# Patient Record
Sex: FEMALE | Race: WHITE | NOT HISPANIC OR LATINO
[De-identification: names, ages, dates, MRNs, and addresses within clinical notes are randomized per-mention and may not be internally consistent; named-entity substitution may affect disease eponyms.]

---

## 2017-01-26 ENCOUNTER — RESULT REVIEW (OUTPATIENT)
Age: 52
End: 2017-01-26

## 2017-06-01 ENCOUNTER — MESSAGE (OUTPATIENT)
Age: 52
End: 2017-06-01

## 2017-10-12 ENCOUNTER — APPOINTMENT (OUTPATIENT)
Dept: OBGYN | Facility: CLINIC | Age: 52
End: 2017-10-12
Payer: MEDICAID

## 2017-10-12 VITALS
BODY MASS INDEX: 24.11 KG/M2 | DIASTOLIC BLOOD PRESSURE: 72 MMHG | WEIGHT: 150 LBS | HEIGHT: 66 IN | SYSTOLIC BLOOD PRESSURE: 112 MMHG

## 2017-10-12 PROCEDURE — 99213 OFFICE O/P EST LOW 20 MIN: CPT

## 2017-10-20 ENCOUNTER — OTHER (OUTPATIENT)
Age: 52
End: 2017-10-20

## 2018-03-28 ENCOUNTER — OTHER (OUTPATIENT)
Age: 53
End: 2018-03-28

## 2018-06-05 ENCOUNTER — APPOINTMENT (OUTPATIENT)
Dept: FAMILY MEDICINE | Facility: CLINIC | Age: 53
End: 2018-06-05
Payer: MEDICAID

## 2018-06-05 VITALS
DIASTOLIC BLOOD PRESSURE: 70 MMHG | WEIGHT: 160.13 LBS | BODY MASS INDEX: 25.73 KG/M2 | SYSTOLIC BLOOD PRESSURE: 110 MMHG | HEART RATE: 70 BPM | HEIGHT: 66 IN | OXYGEN SATURATION: 99 %

## 2018-06-05 DIAGNOSIS — R01.1 CARDIAC MURMUR, UNSPECIFIED: ICD-10-CM

## 2018-06-05 DIAGNOSIS — R53.83 OTHER FATIGUE: ICD-10-CM

## 2018-06-05 PROCEDURE — G0444 DEPRESSION SCREEN ANNUAL: CPT

## 2018-06-05 PROCEDURE — 99203 OFFICE O/P NEW LOW 30 MIN: CPT | Mod: 25

## 2018-06-05 PROCEDURE — 36415 COLL VENOUS BLD VENIPUNCTURE: CPT

## 2018-06-05 RX ORDER — RANITIDINE 300 MG/1
300 TABLET ORAL
Qty: 30 | Refills: 0 | Status: DISCONTINUED | COMMUNITY
Start: 2018-03-23

## 2018-06-05 NOTE — HEALTH RISK ASSESSMENT
[0] : 2) Feeling down, depressed, or hopeless: Not at all (0) [Patient reported mammogram was normal] : Patient reported mammogram was normal [Patient reported PAP Smear was normal] : Patient reported PAP Smear was normal [Patient reported colonoscopy was normal] : Patient reported colonoscopy was normal [HIV test declined] : HIV test declined [Hepatitis C test declined] : Hepatitis C test declined [MammogramDate] : 01/17 [PapSmearDate] : 12/16 [ColonoscopyDate] : 03/18

## 2018-06-05 NOTE — PHYSICAL EXAM
[No Acute Distress] : no acute distress [Well Nourished] : well nourished [Well Developed] : well developed [Well-Appearing] : well-appearing [Normal Sclera/Conjunctiva] : normal sclera/conjunctiva [PERRL] : pupils equal round and reactive to light [Normal Oropharynx] : the oropharynx was normal [Normal TMs] : both tympanic membranes were normal [Normal Appearance] : was normal in appearance [Neck Supple] : was supple [Normal] : the thyroid was normal [No Respiratory Distress] : no respiratory distress  [Clear to Auscultation] : lungs were clear to auscultation bilaterally [Normal Rhythm/Effort] : normal respiratory rhythm and effort [Normal Rate] : normal rate  [Regular Rhythm] : with a regular rhythm [Normal S1, S2] : normal S1 and S2 [No Carotid Bruits] : no carotid bruits [No Edema] : there was no peripheral edema [Soft] : abdomen soft [Non Tender] : non-tender [Normal Bowel Sounds] : normal bowel sounds [Normal Posterior Cervical Nodes] : no posterior cervical lymphadenopathy [Normal Anterior Cervical Nodes] : no anterior cervical lymphadenopathy [Grossly Normal Strength/Tone] : grossly normal strength/tone [No Focal Deficits] : no focal deficits [Alert and Oriented x3] : oriented to person, place, and time [de-identified] : cardiac murmur present [de-identified] : thinning of hair

## 2018-06-05 NOTE — ASSESSMENT
[FreeTextEntry1] : Thinning of hair/hair loss:\par check blood work, reassess\par consider dermatology evaluation\par \par Fatigue:\par check blood work\par \par Cardiac murmur:\par reports not new\par asymptomatic\par consider echo\par \par Health care maintenance:\par check blood work\par follow up with GYN\par \par declines EKG today, will return for annual physical and will have EKG done at that time

## 2018-06-05 NOTE — HISTORY OF PRESENT ILLNESS
[FreeTextEntry1] : Establish care [de-identified] : \par 52 year old female presents as a new patient to establish care. \par \par Concerned about hair loss\par Hair has been falling out in clumps and thinning\par Occurring x 6-8 months\par Does admit to being stressed \par Has not seen dermatology or endocrinology\par \par Feeling fatigued\par no sob or chest pain\par \par Follows with GYN\par \par Up to date with colonoscopy, done in March 2018 by Dr. Colmenares

## 2018-06-06 LAB
25(OH)D3 SERPL-MCNC: 22.2 NG/ML
ALBUMIN SERPL ELPH-MCNC: 4.3 G/DL
ALP BLD-CCNC: 47 U/L
ALT SERPL-CCNC: 12 U/L
ANION GAP SERPL CALC-SCNC: 17 MMOL/L
AST SERPL-CCNC: 19 U/L
BASOPHILS # BLD AUTO: 0.03 K/UL
BASOPHILS NFR BLD AUTO: 0.5 %
BILIRUB SERPL-MCNC: 0.2 MG/DL
BUN SERPL-MCNC: 12 MG/DL
CALCIUM SERPL-MCNC: 9.3 MG/DL
CHLORIDE SERPL-SCNC: 103 MMOL/L
CHOLEST SERPL-MCNC: 155 MG/DL
CHOLEST/HDLC SERPL: 2.5 RATIO
CO2 SERPL-SCNC: 22 MMOL/L
CREAT SERPL-MCNC: 0.85 MG/DL
EOSINOPHIL # BLD AUTO: 0 K/UL
EOSINOPHIL NFR BLD AUTO: 0 %
FERRITIN SERPL-MCNC: 16 NG/ML
FOLATE SERPL-MCNC: 13.9 NG/ML
GLUCOSE SERPL-MCNC: 88 MG/DL
HBA1C MFR BLD HPLC: 5 %
HCT VFR BLD CALC: 36.8 %
HDLC SERPL-MCNC: 63 MG/DL
HGB BLD-MCNC: 11.9 G/DL
IMM GRANULOCYTES NFR BLD AUTO: 0 %
IRON SATN MFR SERPL: 22 %
IRON SERPL-MCNC: 72 UG/DL
LDLC SERPL CALC-MCNC: 80 MG/DL
LYMPHOCYTES # BLD AUTO: 1.77 K/UL
LYMPHOCYTES NFR BLD AUTO: 31.5 %
MAN DIFF?: NORMAL
MCHC RBC-ENTMCNC: 30.6 PG
MCHC RBC-ENTMCNC: 32.3 GM/DL
MCV RBC AUTO: 94.6 FL
MONOCYTES # BLD AUTO: 0.59 K/UL
MONOCYTES NFR BLD AUTO: 10.5 %
NEUTROPHILS # BLD AUTO: 3.23 K/UL
NEUTROPHILS NFR BLD AUTO: 57.5 %
PLATELET # BLD AUTO: 221 K/UL
POTASSIUM SERPL-SCNC: 4.3 MMOL/L
PROT SERPL-MCNC: 7 G/DL
RBC # BLD: 3.89 M/UL
RBC # FLD: 13.8 %
SODIUM SERPL-SCNC: 142 MMOL/L
T4 FREE SERPL-MCNC: 1.3 NG/DL
THYROGLOB AB SERPL-ACNC: <20 IU/ML
THYROPEROXIDASE AB SERPL IA-ACNC: <10 IU/ML
TIBC SERPL-MCNC: 322 UG/DL
TRIGL SERPL-MCNC: 60 MG/DL
TSH SERPL-ACNC: 2.09 UIU/ML
UIBC SERPL-MCNC: 250 UG/DL
URATE SERPL-MCNC: 2.7 MG/DL
VIT B12 SERPL-MCNC: 649 PG/ML
WBC # FLD AUTO: 5.62 K/UL

## 2018-06-08 LAB — ANA SER IF-ACNC: NEGATIVE

## 2018-06-15 ENCOUNTER — RESULT REVIEW (OUTPATIENT)
Age: 53
End: 2018-06-15

## 2018-07-23 PROBLEM — Z13.71 BRCA NEGATIVE: Status: ACTIVE | Noted: 2017-01-19

## 2019-02-18 DIAGNOSIS — R19.5 OTHER FECAL ABNORMALITIES: ICD-10-CM

## 2019-02-19 ENCOUNTER — OTHER (OUTPATIENT)
Age: 54
End: 2019-02-19

## 2019-03-01 ENCOUNTER — RESULT REVIEW (OUTPATIENT)
Age: 54
End: 2019-03-01

## 2019-03-01 PROBLEM — R19.5 GUAIAC POSITIVE STOOLS: Status: RESOLVED | Noted: 2017-10-12 | Resolved: 2019-03-01

## 2019-03-07 ENCOUNTER — MESSAGE (OUTPATIENT)
Age: 54
End: 2019-03-07

## 2020-07-23 ENCOUNTER — APPOINTMENT (OUTPATIENT)
Dept: OBGYN | Facility: CLINIC | Age: 55
End: 2020-07-23
Payer: COMMERCIAL

## 2020-07-23 VITALS
HEIGHT: 66 IN | DIASTOLIC BLOOD PRESSURE: 70 MMHG | WEIGHT: 164 LBS | BODY MASS INDEX: 26.36 KG/M2 | SYSTOLIC BLOOD PRESSURE: 126 MMHG

## 2020-07-23 DIAGNOSIS — L65.9 NONSCARRING HAIR LOSS, UNSPECIFIED: ICD-10-CM

## 2020-07-23 DIAGNOSIS — Z13.228 ENCOUNTER FOR SCREENING FOR OTHER SUSPECTED ENDOCRINE DISORDER: ICD-10-CM

## 2020-07-23 DIAGNOSIS — Z13.0 ENCOUNTER FOR SCREENING FOR DISEASES OF THE BLOOD AND BLOOD-FORMING ORGANS AND CERTAIN DISORDERS INVOLVING THE IMMUNE MECHANISM: ICD-10-CM

## 2020-07-23 DIAGNOSIS — Z13.0 ENCOUNTER FOR SCREENING FOR OTHER SUSPECTED ENDOCRINE DISORDER: ICD-10-CM

## 2020-07-23 DIAGNOSIS — N63.20 UNSPECIFIED LUMP IN THE LEFT BREAST, UNSPECIFIED QUADRANT: ICD-10-CM

## 2020-07-23 DIAGNOSIS — Z87.19 PERSONAL HISTORY OF OTHER DISEASES OF THE DIGESTIVE SYSTEM: ICD-10-CM

## 2020-07-23 DIAGNOSIS — Z13.220 ENCOUNTER FOR SCREENING FOR LIPOID DISORDERS: ICD-10-CM

## 2020-07-23 DIAGNOSIS — Z13.29 ENCOUNTER FOR SCREENING FOR OTHER SUSPECTED ENDOCRINE DISORDER: ICD-10-CM

## 2020-07-23 LAB
HCG UR QL: NEGATIVE
HEMOCCULT SP1 STL QL: NEGATIVE
QUALITY CONTROL: YES

## 2020-07-23 PROCEDURE — 81025 URINE PREGNANCY TEST: CPT

## 2020-07-23 PROCEDURE — 99396 PREV VISIT EST AGE 40-64: CPT

## 2020-07-23 PROCEDURE — 82270 OCCULT BLOOD FECES: CPT

## 2020-07-23 NOTE — REVIEW OF SYSTEMS
[Headache] : headache [Nl] : Musculoskeletal [Feeling Tired] : feeling tired [Recent Wt Gain ___ Lbs] : recent [unfilled] ~Ulb weight gain [Lower Ext Edema] : lower extremity edema [Breast Lump] : breast lump [Breast Pain] : breast pain [Cold Intolerance] : intolerance to cold [Heat Intolerance] : intolerance to heat [Loss of Hair] : loss of hair

## 2020-07-23 NOTE — HISTORY OF PRESENT ILLNESS
[___ Year(s) Ago] : [unfilled] year(s) ago [Good] : being in good health [Healthy Diet] : a healthy diet [Regular Exercise] : regular exercise [___ Servings of Dairy/Day] : [unfilled] servings of dairy foods per day [Last Pap ___] : Last cervical pap smear was [unfilled] [Last Mammogram ___] : Last Mammogram was [unfilled] [FH Cardiovascular Disease] : family history of cardiovascular disease [Abnormal Cervical Cytology] : abnormal cervical cytology [HPV Positive] : positive screening for human papilloma virus [Reproductive Age] : is of reproductive age [Definite ___ (Date)] : the last menstrual period was [unfilled] [Contraception] : uses contraception [Bleeding Usually Lasts ___ Days] : usually last [unfilled] days [Vasectomy (partner)] : has a partner with a vasectomy [Pregnancy History] : pregnancy history: [Total Preg ___] : [unfilled] [Full Term ___] : [unfilled] [Sexually Active] : is sexually active [Monogamous (Male Partner)] : is monogamous with a male partner [Last Screen ___] : last STD screen was [unfilled] [HPV Vaccine NA Due to Age] : HPV vaccine not available to patient due to age [Weight Concerns] : weight concens [3 or more times/wk] :  3 or more times per week [Recent Weight Gain (___ Lbs)] : recent [unfilled] ~Ulbs weight gain [Current] : risk screening reviewed and current [Last Colonoscopy ___] : Last colonoscopy [unfilled] [Performed Within 5 Years] : lipid profile performed within the past five years [Performed: ___] : thyroid function test performed [unfilled] [Irregular Cycle Intervals] : are  irregular [Localized Pain] : localized breast pain [Mass (___cm)] : [unfilled]Ucm breast mass [Left Breast] : left superior lateral [Continuous] : a continuous [7/10] : is 7/10 in severity [Pressure] : worsened with pressure [Family Breast Cancer] : family history of breast cancer [Hypertension] : no hypertension [Diabetes] : no diabetes [High LDL] : no high LDL cholesterol [Low HDL] : no low HDL cholesterol [Stress] : no stress [Obesity] : no obesity [Tobacco Use] : no tobacco use [Illicit Drug Use] : no illicit drug use [Sedentary Lifestyle] : no sedentary lifestyle [Previous Breast Cancer] : no previous breast cancer [In Utero JENNIFER Exposure] : no diethylstilbestrol (JENNIFER) exposure in utero [Menstrual Problems] : reports normal menses [Chlamydia] : Chlamydia - [Gonorrhea] : Gonorrhea - [FreeTextEntry2] : excessive caffeine ntake [Diffused Pain] : no diffused pain [Nipple Discharge] : no nipple discharge [Skin Color Change] : no skin color change [Fever] : no fever [Chills] : no chills [Swollen Glands] : no swollen glands [FreeTextEntry3] : good supporting bra

## 2020-07-23 NOTE — COUNSELING
[Nutrition] : nutrition [Breast Self Exam] : breast self exam [Exercise] : exercise [FreeTextEntry2] : Call for any abnormal bleeding

## 2020-07-23 NOTE — CHIEF COMPLAINT
[Annual Visit] : annual visit [FreeTextEntry1] : left breast lump and left breast pain, for about 10 days.  Denies nipple discharge, redness,retraction.\par Perimenopausal, LMP April 2020\par Recent weight gain\par Hair thinning, "half the thickness it used to be"  comes out in clumps

## 2020-07-23 NOTE — PHYSICAL EXAM
[Awake] : awake [Alert] : alert [Soft] : soft [Oriented x3] : oriented to person, place, and time [No Bleeding] : there was no active vaginal bleeding [Uterine Adnexae] : were not tender and not enlarged [No Tenderness] : no rectal tenderness [RRR, No Murmurs] : RRR, no murmurs [CTAB] : CTAB [Acute Distress] : no acute distress [LAD] : no lymphadenopathy [Thyroid Nodule] : no thyroid nodule [Goiter] : no goiter [Mass] : breast mass [Nipple Discharge] : no nipple discharge [Axillary LAD] : no axillary lymphadenopathy [Normal] : normal [Enlargement Of The Left Breast] : swelling [The Right Breast Was Examined] : a normal appearance [Breast Mass Right Breast ___cm] : no was mass palpable [Tenderness Of The Left Breast] : tenderness [___cm] : a ~M [unfilled] ~Ucm superior lateral quadrant mass was palpated [No Masses] : no breast masses were palpable [Occult Blood] : occult blood test from digital rectal exam was negative [Tender] : non tender

## 2020-07-24 LAB
BASOPHILS # BLD AUTO: 0.05 K/UL
BASOPHILS NFR BLD AUTO: 0.7 %
EOSINOPHIL # BLD AUTO: 0.15 K/UL
EOSINOPHIL NFR BLD AUTO: 2.2 %
FSH SERPL-MCNC: 44 IU/L
HCT VFR BLD CALC: 37.9 %
HGB BLD-MCNC: 12.1 G/DL
IMM GRANULOCYTES NFR BLD AUTO: 0.3 %
LYMPHOCYTES # BLD AUTO: 1.91 K/UL
LYMPHOCYTES NFR BLD AUTO: 27.7 %
MAN DIFF?: NORMAL
MCHC RBC-ENTMCNC: 30.4 PG
MCHC RBC-ENTMCNC: 31.9 GM/DL
MCV RBC AUTO: 95.2 FL
MONOCYTES # BLD AUTO: 0.75 K/UL
MONOCYTES NFR BLD AUTO: 10.9 %
NEUTROPHILS # BLD AUTO: 4.01 K/UL
NEUTROPHILS NFR BLD AUTO: 58.2 %
PLATELET # BLD AUTO: 209 K/UL
RBC # BLD: 3.98 M/UL
RBC # FLD: 12.6 %
TSH SERPL-ACNC: 1.94 UIU/ML
WBC # FLD AUTO: 6.89 K/UL

## 2020-07-27 ENCOUNTER — APPOINTMENT (OUTPATIENT)
Dept: BREAST CENTER | Facility: CLINIC | Age: 55
End: 2020-07-27
Payer: COMMERCIAL

## 2020-07-27 ENCOUNTER — LABORATORY RESULT (OUTPATIENT)
Age: 55
End: 2020-07-27

## 2020-07-27 VITALS
SYSTOLIC BLOOD PRESSURE: 118 MMHG | DIASTOLIC BLOOD PRESSURE: 67 MMHG | WEIGHT: 164 LBS | HEIGHT: 66 IN | BODY MASS INDEX: 26.36 KG/M2 | HEART RATE: 59 BPM

## 2020-07-27 DIAGNOSIS — Z80.0 FAMILY HISTORY OF MALIGNANT NEOPLASM OF DIGESTIVE ORGANS: ICD-10-CM

## 2020-07-27 PROCEDURE — 99203 OFFICE O/P NEW LOW 30 MIN: CPT | Mod: 25

## 2020-07-27 PROCEDURE — 10005 FNA BX W/US GDN 1ST LES: CPT

## 2020-07-27 NOTE — PAST MEDICAL HISTORY
[Menarche Age ____] : age at menarche was [unfilled] [Definite ___ (Date)] : the last menstrual period was [unfilled] [Irregular Cycle Intervals] : are  irregular [Total Preg ___] : G[unfilled] [Live Births ___] : P[unfilled]  [Age At Live Birth ___] : Age at live birth: [unfilled]

## 2020-07-27 NOTE — PHYSICAL EXAM
[Normocephalic] : normocephalic [Atraumatic] : atraumatic [Sclera nonicteric] : sclera nonicteric [Conjunctiva pink] : conjunctiva pink [Supple] : supple [No Cervical Adenopathy] : no cervical adenopathy [No Supraclavicular Adenopathy] : no supraclavicular adenopathy [No Thyromegaly] : no thyromegaly [Normal Sinus Rhythm] : normal sinus rhythm [Clear to Auscultation Bilat] : clear to auscultation bilaterally [No Rubs] : no pericardial rub [No Gallops] : no gallops [Examined in the supine and seated position] : examined in the supine and seated position [Symmetrical] : symmetrical [Grade 2] : Ptosis Grade 2 [No dominant masses] : no dominant masses in right breast  [No dominant masses] : no dominant masses left breast [No Nipple Retraction] : no left nipple retraction [No Nipple Discharge] : no left nipple discharge [No Axillary Lymphadenopathy] : no left axillary lymphadenopathy [Soft] : abdomen soft [No Hepato-Splenomegaly] : no hepato-splenomegaly [No Rashes] : no rashes [No Edema] : no edema [No Ulceration] : no ulceration [de-identified] : Large, tender palpable cyst left breast at 2:00 N6.

## 2020-07-27 NOTE — HISTORY OF PRESENT ILLNESS
[FreeTextEntry1] : 54 year old female presents with an enlarging, painful left breast lump which appears to be consistent with a 5 cm cyst on ultrasound.  The patient was referred by Dr. Fernando Matos.

## 2020-07-27 NOTE — CONSULT LETTER
[Dear  ___] : Dear ~KAYLIN, [Consult Letter:] : I had the pleasure of evaluating your patient, [unfilled]. [Please see my note below.] : Please see my note below. [Consult Closing:] : Thank you very much for allowing me to participate in the care of this patient.  If you have any questions, please do not hesitate to contact me. [Sincerely,] : Sincerely, [FreeTextEntry2] : Fernando Matos MD

## 2020-07-27 NOTE — DATA REVIEWED
[FreeTextEntry1] : Mammogram:  7/7/20            Findings:     No suspicious findings.  Multiple cysts.\par Breast Ultrasound:  7/7/20   Findings:    Multiple, bilateral cysts.  Stable 1 cm hypoechoic nodule Right breast at 9:00 N3.  In the area of patient concern, left breast 2:00 N6 there is a 4.9 x 4.6 cm cyst.\par

## 2020-08-03 LAB
CYTOLOGY CVX/VAG DOC THIN PREP: NORMAL
HPV HIGH+LOW RISK DNA PNL CVX: NOT DETECTED

## 2020-08-12 LAB
TESTOST BND SERPL-MCNC: 2.4 PG/ML
TESTOST SERPL-MCNC: 23.2 NG/DL

## 2021-04-22 ENCOUNTER — APPOINTMENT (OUTPATIENT)
Dept: DERMATOLOGY | Facility: CLINIC | Age: 56
End: 2021-04-22

## 2021-05-24 DIAGNOSIS — N63.0 UNSPECIFIED LUMP IN UNSPECIFIED BREAST: ICD-10-CM

## 2021-05-24 DIAGNOSIS — R93.89 ABNORMAL FINDINGS ON DIAGNOSTIC IMAGING OF OTHER SPECIFIED BODY STRUCTURES: ICD-10-CM

## 2021-08-11 ENCOUNTER — NON-APPOINTMENT (OUTPATIENT)
Age: 56
End: 2021-08-11

## 2021-08-11 ENCOUNTER — APPOINTMENT (OUTPATIENT)
Dept: OBGYN | Facility: CLINIC | Age: 56
End: 2021-08-11
Payer: COMMERCIAL

## 2021-08-11 VITALS
BODY MASS INDEX: 23.63 KG/M2 | DIASTOLIC BLOOD PRESSURE: 74 MMHG | HEIGHT: 66 IN | SYSTOLIC BLOOD PRESSURE: 112 MMHG | WEIGHT: 147 LBS

## 2021-08-11 DIAGNOSIS — Z12.11 ENCOUNTER FOR SCREENING FOR MALIGNANT NEOPLASM OF COLON: ICD-10-CM

## 2021-08-11 DIAGNOSIS — R92.2 INCONCLUSIVE MAMMOGRAM: ICD-10-CM

## 2021-08-11 DIAGNOSIS — N63.20 UNSPECIFIED LUMP IN THE LEFT BREAST, UNSPECIFIED QUADRANT: ICD-10-CM

## 2021-08-11 DIAGNOSIS — N64.4 MASTODYNIA: ICD-10-CM

## 2021-08-11 PROCEDURE — 99396 PREV VISIT EST AGE 40-64: CPT

## 2021-08-11 NOTE — PLAN
[FreeTextEntry1] : NEW ONSET OF CLIMACTERIC SYMPTOMS, LIFESTYLE AND SUPPLEMENTS DISCUSSED.  RTO PRN WORSENING SYMPTOMS.

## 2021-08-11 NOTE — HISTORY OF PRESENT ILLNESS
[FreeTextEntry1] : IRREGULAR MENSES, INSOMNIA, NIGHT SWEATS AND MOODINESS SINCE 4/2021.  WORKS 3 JOBS.  EXERCISES (WALKING) AND EATS WELL.  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 Discussed diet in particular, low carbohydrate diet, exercise, in particular aerobic exercise and use of vitamins and supplements. Use of hormone replacement therapy and low-dose antidepressants for symptom relief\par \par HAS HAD 25# INTENTIONAL WEIGHT LOSS.  \par \par BREAST PAIN AND CYSTS ARE INTERMITTENT IN SEVERITY.  HAS HAD CYST DRAINED BY DR. MALDONADO IN PAST.  STABLE NOW.  RADIOLOGIST ORDERING 6 MONTH F/U TO 5/2021 MAMMO/SONO DUE TO LUOQ DENSE AREA.\par \par DISCUSSED PRECAUTIONS AGAINST COVID19, INCLUDING MASK WEARING, SOCIAL DISTANCING AND HAND WASHING.\par VACCINATED X 2. [Patient reported mammogram was abnormal] : Patient reported mammogram was abnormal [Patient reported breast sonogram was abnormal] : Patient reported breast sonogram was abnormal [Patient reported PAP Smear was normal] : Patient reported PAP Smear was normal [Patient reported bone density results were normal] : Patient reported bone density results were normal [Mammogramdate] : 5/2021 [TextBox_19] : DENSE [BreastSonogramDate] : 5/2021 [TextBox_25] : MULTIPLE CYSTS, NODULES [PapSmeardate] : 7/2020 [TextBox_31] : REQUESTS YEARLY [BoneDensityDate] : 5/2021 [Currently Active] : currently active [Men] : men [No] : No [FreeTextEntry2] : MUTUALLY MONOGAMOUS PARTNER OF 12 YEARS

## 2021-08-11 NOTE — PHYSICAL EXAM
[Appropriately responsive] : appropriately responsive [Alert] : alert [No Acute Distress] : no acute distress [No Murmurs] : no murmurs [Soft] : soft [Non-tender] : non-tender [Non-distended] : non-distended [No HSM] : No HSM [No Lesions] : no lesions [No Mass] : no mass [Oriented x3] : oriented x3 [Examination Of The Breasts] : a normal appearance [Breast Mass Right Breast ___cm] : no was mass palpable [___cm] : a ~M [unfilled] ~Ucm superior lateral quadrant mass was palpated [Labia Majora] : normal [Labia Minora] : normal [Normal] : normal [Uterine Adnexae] : normal [No Tenderness] : no tenderness [Nl Sphincter Tone] : normal sphincter tone [FreeTextEntry9] : GUAIAC NEGATIVE

## 2021-08-13 LAB — HPV HIGH+LOW RISK DNA PNL CVX: NOT DETECTED

## 2021-08-16 LAB — CYTOLOGY CVX/VAG DOC THIN PREP: NORMAL

## 2021-11-03 ENCOUNTER — NON-APPOINTMENT (OUTPATIENT)
Age: 56
End: 2021-11-03

## 2022-06-29 ENCOUNTER — NON-APPOINTMENT (OUTPATIENT)
Age: 57
End: 2022-06-29

## 2022-08-16 ENCOUNTER — APPOINTMENT (OUTPATIENT)
Dept: OBGYN | Facility: CLINIC | Age: 57
End: 2022-08-16

## 2022-08-16 VITALS
SYSTOLIC BLOOD PRESSURE: 120 MMHG | DIASTOLIC BLOOD PRESSURE: 70 MMHG | HEIGHT: 66 IN | WEIGHT: 170 LBS | BODY MASS INDEX: 27.32 KG/M2

## 2022-08-16 PROCEDURE — 99396 PREV VISIT EST AGE 40-64: CPT

## 2022-08-16 NOTE — PHYSICAL EXAM
[Appropriately responsive] : appropriately responsive [Alert] : alert [No Acute Distress] : no acute distress [Soft] : soft [Non-tender] : non-tender [Non-distended] : non-distended [No HSM] : No HSM [No Lesions] : no lesions [No Mass] : no mass [Oriented x3] : oriented x3 [FreeTextEntry7] : UMBILICAL PIERCING [Examination Of The Breasts] : a normal appearance [No Masses] : no breast masses were palpable [Labia Majora] : normal [Labia Minora] : normal [Normal] : normal [Uterine Adnexae] : normal [Exam Deferred] : was deferred [FreeTextEntry2] : CLITORAL MENDOZA PIERCING

## 2022-08-16 NOTE — HISTORY OF PRESENT ILLNESS
[FreeTextEntry1] : \par BIRADS 3 MAMMO, NEW CYSTS ON BREAST US, BILATERAL.  FOR F/U 11/2022.\par \par C/O HAIR LOSS, WEIGHT GAIN, WITH MENOPAUSE.  NO VAGINAL COMPLAINTS.  STARTED ZOLOFT 25 MG Z 1 1/2 MONTHS.  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  NO DATE SET FOR [place].  SON ENGAGED FOR 9/2024 [place].   \par  [Mammogramdate] : 5/2022 [TextBox_19] : BR3 [BreastSonogramDate] : 5/2022 [TextBox_25] : for f/u 11/2022 [PapSmeardate] : 08/11/2021 [TextBox_31] : NEG [BoneDensityDate] : 05/24/2021 [ColonoscopyDate] : 7/2022 [TextBox_43] : NEGATIVE, UPCOMING ENDOSCOPY [HPVDate] : 08/11/2021 [TextBox_78] : NEG [Currently Active] : currently active [Men] : men [No] : No [FreeTextEntry2] : FIANCE

## 2023-08-22 ENCOUNTER — APPOINTMENT (OUTPATIENT)
Dept: OBGYN | Facility: CLINIC | Age: 58
End: 2023-08-22
Payer: COMMERCIAL

## 2023-08-22 VITALS
SYSTOLIC BLOOD PRESSURE: 120 MMHG | DIASTOLIC BLOOD PRESSURE: 72 MMHG | HEIGHT: 63 IN | WEIGHT: 170 LBS | BODY MASS INDEX: 30.12 KG/M2

## 2023-08-22 DIAGNOSIS — Z01.419 ENCOUNTER FOR GYNECOLOGICAL EXAMINATION (GENERAL) (ROUTINE) W/OUT ABNORMAL FINDINGS: ICD-10-CM

## 2023-08-22 DIAGNOSIS — N60.02 SOLITARY CYST OF LEFT BREAST: ICD-10-CM

## 2023-08-22 DIAGNOSIS — Z12.39 ENCOUNTER FOR OTHER SCREENING FOR MALIGNANT NEOPLASM OF BREAST: ICD-10-CM

## 2023-08-22 DIAGNOSIS — N95.1 MENOPAUSAL AND FEMALE CLIMACTERIC STATES: ICD-10-CM

## 2023-08-22 DIAGNOSIS — N60.01 SOLITARY CYST OF RIGHT BREAST: ICD-10-CM

## 2023-08-22 LAB
DATE COLLECTED: NORMAL
HEMOCCULT SP1 STL QL: NEGATIVE
QUALITY CONTROL: YES

## 2023-08-22 PROCEDURE — 99396 PREV VISIT EST AGE 40-64: CPT

## 2023-08-22 PROCEDURE — 82270 OCCULT BLOOD FECES: CPT

## 2023-08-24 PROBLEM — N95.1 MENOPAUSAL SYMPTOMS: Status: ACTIVE | Noted: 2022-08-16

## 2023-08-24 LAB — HPV HIGH+LOW RISK DNA PNL CVX: NOT DETECTED

## 2023-08-24 NOTE — HISTORY OF PRESENT ILLNESS
[FreeTextEntry1] : NO URINARY C/O.  STATES "HORRIFIC WEIGHT GAIN, 40#."  USED OZEMPIC X 1 MONTH, BUT STOPPED BECAUSE  PARTNER VERY CONCERNED RE:SIDE EFFECTS.   HAS JOINED [employer].  BREAST SCREENING INTERVALS REVIEWED, BASED ON EVIDENCE BASED RECOMMENDATIONS FOR BREAST CANCER IN THE [place] FOR Patient's AGE, PERSONAL AND FAMILY CANCER HISTORIES.  DISCUSSED UTILITY OF BREAST MAMMOGRAM, SONOGRAM AND MRI.   RECOMMENDED APPROPRIATE BREAST TESTING.  MANAGEMENT OF PAP SMEAR AND D/W PATIENT.  OPTIONS INCLUDE YEARLY PAP VS. Q 3 YEARS.  RISKS OF OVER-TREATMENT OF BENIGN DISEASE VERSUS "MISSING" CERVICAL DISEASE DISCUSSED.    SON ENGAGED, TO BE  11/2024.  STILL LIVING WITH SIGNIFICANT OTHER, 21 YO STEPSON.  [TextBox_4] : ANNUAL [Mammogramdate] : 11/28/22 [TextBox_19] : BR 2  [TextBox_25] : BR 2 [PapSmeardate] : 5/11/21 [TextBox_31] : NEGATIVE [BoneDensityDate] : 5/24/21 [TextBox_37] : WNR [ColonoscopyDate] : 7/2022 [TextBox_43] : UP COMING ENDOSCOPY  [LMPDate] : 4/1/21 [Currently Active] : currently active [Men] : men [No] : No [FreeTextEntry2] : FIANCE

## 2023-08-24 NOTE — PHYSICAL EXAM
[Chaperone Present] : A chaperone was present in the examining room during all aspects of the physical examination [FreeTextEntry1] : RAMIREZ AGUERO [Appropriately responsive] : appropriately responsive [Alert] : alert [No Acute Distress] : no acute distress [Soft] : soft [Non-tender] : non-tender [Non-distended] : non-distended [No HSM] : No HSM [No Lesions] : no lesions [No Mass] : no mass [Oriented x3] : oriented x3 [FreeTextEntry7] : UMBILICAL PIERCING [Examination Of The Breasts] : a normal appearance [No Masses] : no breast masses were palpable [Labia Majora] : normal [Labia Minora] : normal [Normal] : normal [Uterine Adnexae] : non-palpable [No Tenderness] : no tenderness [Nl Sphincter Tone] : normal sphincter tone [FreeTextEntry2] : CLITORAL MENDOZA PIERCING [FreeTextEntry9] : GUAIAC NEGATIVE

## 2023-09-05 LAB — CYTOLOGY CVX/VAG DOC THIN PREP: NORMAL

## 2024-09-19 ENCOUNTER — APPOINTMENT (OUTPATIENT)
Dept: OBGYN | Facility: CLINIC | Age: 59
End: 2024-09-19